# Patient Record
Sex: FEMALE | URBAN - METROPOLITAN AREA
[De-identification: names, ages, dates, MRNs, and addresses within clinical notes are randomized per-mention and may not be internally consistent; named-entity substitution may affect disease eponyms.]

---

## 2024-01-09 NOTE — PROGRESS NOTES
"  [facility]   [facility] [place]       Reason For Consult  \"Cirrhosis\" during fibroscan    SUBJECTIVE     History Of Present Illness  Jeni Vasquez is a 53 y.o. female with a past medical history of overweight (BMI 28), THONG with CPAP, asthma referred for liver evaluation.   Pt recently underwent colonoscopy. At the visit, pt had fibroscan that was consistent with cirrhosis (7/20/2023 Fibro Scan; , E 16.2 kPa).    Pt recalls to have fatty liver disease 15 years ago.   Endorses intermittent RUQ \"nagging\" pain. No nausea/vomiting, joint pain, melena/hematochezia.  Pt has asthma on daily inhalers. Not requiring systemic prednisone.    Used to drink 4~5 glasses of chantelle, vodka martini 5~7/week but cut down to 2~3 vodka martini 2/week after told to have \"cirrhosis\".   In terms of diet, pt is avoiding processed food, lots of fish, rice, pasta, vegetables/berries, ocassional chips.   For exercise, used to hike but limited due to asthma.   Weight fluctuates 10 lbs, but stable around 190 lbs for 27 years since she delivered her son.     PMHx:  Asthma on daily inhalers    Meds:  Takes multivitamine  Denies tylenol or NSAIDs.     Fhx:  Autoimmune issues in father? (Non-hodgikin mantle)  Father with colon cancer diagnosed  (late 40s)    Review of Systems  Constitutional: denies fever, chills, weight loss  HEENT: denies oral ulcers, dysphagia  Cardiovascular: denies chest pain  Respiratory: denies shortness of breath  GI: see HPI  Musculoskeletal: denies arthralgia, myalgia  Hem/Onc: denies easy bleeding or bruising  Dermatology: denies jaundice, rash  Psychology: denies depression    All ROS were negative unless otherwise stated above.         Past Medical History:    Past Medical History:   Diagnosis Date    Asthma     Cirrhosis (CMS/HCC)        Home Medications  (Not in a hospital admission)        Surgical History:    Past Surgical History:   Procedure Laterality Date    " "COLONOSCOPY         Allergies:    Allergies   Allergen Reactions    Acetaminophen GI Upset and Rash     Other reaction(s): GI Upset    Hydrocodone-Acetaminophen GI Upset and Unknown     Other reaction(s): GI Upset, Other (See Comments)       Social History:    Social History     Socioeconomic History    Marital status: Unknown     Spouse name: Not on file    Number of children: Not on file    Years of education: Not on file    Highest education level: Not on file   Occupational History    Not on file   Tobacco Use    Smoking status: Never     Passive exposure: Never    Smokeless tobacco: Never   Vaping Use    Vaping Use: Never used   Substance and Sexual Activity    Alcohol use: Yes    Drug use: Never    Sexual activity: Yes     Partners: Male   Other Topics Concern    Not on file   Social History Narrative    Not on file     Social Determinants of Health     Financial Resource Strain: Not on file   Food Insecurity: No Food Insecurity (1/10/2024)    Hunger Vital Sign     Worried About Running Out of Food in the Last Year: Never true     Ran Out of Food in the Last Year: Never true   Transportation Needs: Not on file   Physical Activity: Not on file   Stress: Not on file   Social Connections: Not on file   Intimate Partner Violence: Not on file   Housing Stability: Not on file       Family History:    Family History   Problem Relation Name Age of Onset    COPD Mother      Other (mantle cell lymphoma) Father         EXAM     Vitals:    Vitals:    01/10/24 0806   BP: (!) 154/104   Pulse: 84   Resp: 18   Temp: 36.4 °C (97.6 °F)   SpO2: 97%   Weight: 85.5 kg (188 lb 8 oz)   Height: 1.753 m (5' 9\")         Physical Exam  General: well-nourished, no acute distress  HEENT: EOMI. Moist mucosa  Respiratory: nonlabored breathing on room air  Cardiovascular: RRR, no murmurs/rubs/gallops  Abdomen: Soft, nontender, nondistended, bowel sounds present. No masses palpated  Extremities: no edema, no asterixis  Neuro: alert and " oriented, CNII-XII grossly intact, moves all 4 extremities with no focal deficits    OBJECTIVE                                                                              Medications       Current Outpatient Medications:     albuterol 2.5 mg /3 mL (0.083 %) nebulizer solution, 3 mL every 6 hours if needed., Disp: , Rfl:     albuterol 90 mcg/actuation inhaler, Inhale 2 puffs 4 times a day., Disp: , Rfl:     fluticasone (Flonase) 50 mcg/actuation nasal spray, Administer 2 sprays into affected nostril(s) once daily., Disp: , Rfl:     fluticasone propion-salmeteroL (Advair Diskus) 100-50 mcg/dose diskus inhaler, 1 puff 2 times a day., Disp: , Rfl:     ibuprofen 400 mg tablet, Take 1 tablet (400 mg) by mouth every 6 hours if needed., Disp: , Rfl:     loratadine (Claritin) 10 mg tablet, Take 1 tablet (10 mg) by mouth once daily., Disp: , Rfl:     traZODone (Desyrel) 100 mg tablet, Take 0.5 tablets (50 mg) by mouth as needed at bedtime., Disp: , Rfl:                                                                             Labs     3/2022   AST 45, ALT 42  INR 1.0   Alb4.3-->2.5   TP 8.1  ALP 82  T-rosmery 0.7                                                                              Imaging           3/23/2022  Liver U/S  Liver: Normal liver parenchyma is noted. No focal hepatic mass is seen.   The portal vein and hepatic veins are within normal limits.     4/18/2023  CTAP  Liver:       Echotexture:  Normal, homogeneous.        Echogenicity:  Increased        Surface contour:  Smooth        Lesions:  None.                                                                            GI Procedures     Scanned colonoscopy report         ASSESSMENT / PLAN                  ASSESSMENT/PLAN:    Jeni Vasquez is a 53 y.o. female with a past medical history of overweight (BMI 28), THONG with CPAP, asthma referred here after found to have F4 cirrhosis during fibroscan.     #THONG with CPAP  #Fatty liver   #Overweight (BMI  38)  #Thrombocytopenia  :: 3/2022 AST/ALT 45/42<2, INR 1.0 Alb 2.5,   :: 7/2023 NORM 1:80  :: Fibroscan 7/20/2023 Fibro Scan; , E 16.2 kPa  :: Liver U/S, CTAP from 2023 showed no signs of liver cirrhosis   -While fibroscan suggests f4 cirrhosis during fibroscan 7/2023, this may be falsely high in the setting of fatty liver especially CT or U/S from 2023 shows no changes suggestive of cirrhosis.   -NORM noted to be positive 1:80 which may suggest underlying autoimmune disease or other etiologies in addition to fatty liver.    Plan  -Obtain viral hepatitis, AMA/AMA, ceruloplasmin, AFP, A1AT, LFT, INR  -Transcutaneous liver biopsy to assess liver fibrosis  -We discussed importance of lifestyle modification (increase exercise, cut down intake of carbohydrates such as pasta, bread, juice, soda)   -RTC in 6 mos     Patient was seen and discussed with Dr. Cl Garcia MD  PGY4 Gastroenterology Fellow  [facility] [place]

## 2024-01-10 ENCOUNTER — OFFICE VISIT (OUTPATIENT)
Dept: GASTROENTEROLOGY | Facility: HOSPITAL | Age: 54
End: 2024-01-10
Payer: COMMERCIAL

## 2024-01-10 ENCOUNTER — LAB (OUTPATIENT)
Dept: LAB | Facility: LAB | Age: 54
End: 2024-01-10
Payer: COMMERCIAL

## 2024-01-10 VITALS
HEIGHT: 69 IN | WEIGHT: 188.5 LBS | OXYGEN SATURATION: 97 % | HEART RATE: 84 BPM | SYSTOLIC BLOOD PRESSURE: 154 MMHG | TEMPERATURE: 97.6 F | BODY MASS INDEX: 27.92 KG/M2 | RESPIRATION RATE: 18 BRPM | DIASTOLIC BLOOD PRESSURE: 104 MMHG

## 2024-01-10 DIAGNOSIS — K74.60 CIRRHOSIS OF LIVER WITHOUT ASCITES, UNSPECIFIED HEPATIC CIRRHOSIS TYPE (MULTI): ICD-10-CM

## 2024-01-10 DIAGNOSIS — K74.60 CIRRHOSIS OF LIVER WITHOUT ASCITES, UNSPECIFIED HEPATIC CIRRHOSIS TYPE (MULTI): Primary | ICD-10-CM

## 2024-01-10 LAB
A1AT SERPL NEPH-MCNC: 152 MG/DL (ref 84–218)
AFP SERPL-MCNC: 8 NG/ML (ref 0–9)
ALBUMIN SERPL BCP-MCNC: 4.3 G/DL (ref 3.4–5)
ALP SERPL-CCNC: 87 U/L (ref 33–110)
ALT SERPL W P-5'-P-CCNC: 45 U/L (ref 7–45)
ANION GAP SERPL CALC-SCNC: 14 MMOL/L (ref 10–20)
AST SERPL W P-5'-P-CCNC: 59 U/L (ref 9–39)
BASOPHILS # BLD AUTO: 0.09 X10*3/UL (ref 0–0.1)
BASOPHILS NFR BLD AUTO: 1.3 %
BILIRUB DIRECT SERPL-MCNC: 0.3 MG/DL (ref 0–0.3)
BILIRUB SERPL-MCNC: 1.5 MG/DL (ref 0–1.2)
BUN SERPL-MCNC: 8 MG/DL (ref 6–23)
CALCIUM SERPL-MCNC: 10.1 MG/DL (ref 8.6–10.6)
CERULOPLASMIN SERPL-MCNC: 29.2 MG/DL (ref 20–60)
CHLORIDE SERPL-SCNC: 104 MMOL/L (ref 98–107)
CO2 SERPL-SCNC: 27 MMOL/L (ref 21–32)
CREAT SERPL-MCNC: 0.75 MG/DL (ref 0.5–1.05)
EGFRCR SERPLBLD CKD-EPI 2021: >90 ML/MIN/1.73M*2
EOSINOPHIL # BLD AUTO: 0.14 X10*3/UL (ref 0–0.7)
EOSINOPHIL NFR BLD AUTO: 2.1 %
ERYTHROCYTE [DISTWIDTH] IN BLOOD BY AUTOMATED COUNT: 12.9 % (ref 11.5–14.5)
FERRITIN SERPL-MCNC: 335 NG/ML (ref 8–150)
GLUCOSE SERPL-MCNC: 90 MG/DL (ref 74–99)
HAV AB SER QL IA: REACTIVE
HBV CORE AB SER QL: REACTIVE
HBV SURFACE AB SER-ACNC: <3.1 MIU/ML
HBV SURFACE AG SERPL QL IA: NONREACTIVE
HCT VFR BLD AUTO: 49.9 % (ref 36–46)
HCV AB SER QL: NONREACTIVE
HGB BLD-MCNC: 16.6 G/DL (ref 12–16)
IMM GRANULOCYTES # BLD AUTO: 0.03 X10*3/UL (ref 0–0.7)
IMM GRANULOCYTES NFR BLD AUTO: 0.4 % (ref 0–0.9)
INR PPP: 1 (ref 0.9–1.1)
IRON SATN MFR SERPL: 50 % (ref 25–45)
IRON SERPL-MCNC: 185 UG/DL (ref 35–150)
LYMPHOCYTES # BLD AUTO: 1.84 X10*3/UL (ref 1.2–4.8)
LYMPHOCYTES NFR BLD AUTO: 27.6 %
MCH RBC QN AUTO: 34.6 PG (ref 26–34)
MCHC RBC AUTO-ENTMCNC: 33.3 G/DL (ref 32–36)
MCV RBC AUTO: 104 FL (ref 80–100)
MONOCYTES # BLD AUTO: 0.45 X10*3/UL (ref 0.1–1)
MONOCYTES NFR BLD AUTO: 6.7 %
NEUTROPHILS # BLD AUTO: 4.12 X10*3/UL (ref 1.2–7.7)
NEUTROPHILS NFR BLD AUTO: 61.9 %
NRBC BLD-RTO: 0 /100 WBCS (ref 0–0)
PLATELET # BLD AUTO: 166 X10*3/UL (ref 150–450)
POTASSIUM SERPL-SCNC: 4 MMOL/L (ref 3.5–5.3)
PROT SERPL-MCNC: 7.5 G/DL (ref 6.4–8.2)
PROTHROMBIN TIME: 11 SECONDS (ref 9.8–12.8)
RBC # BLD AUTO: 4.8 X10*6/UL (ref 4–5.2)
SODIUM SERPL-SCNC: 141 MMOL/L (ref 136–145)
TIBC SERPL-MCNC: 370 UG/DL (ref 240–445)
UIBC SERPL-MCNC: 185 UG/DL (ref 110–370)
WBC # BLD AUTO: 6.7 X10*3/UL (ref 4.4–11.3)

## 2024-01-10 PROCEDURE — 82390 ASSAY OF CERULOPLASMIN: CPT

## 2024-01-10 PROCEDURE — 85610 PROTHROMBIN TIME: CPT

## 2024-01-10 PROCEDURE — 80053 COMPREHEN METABOLIC PANEL: CPT

## 2024-01-10 PROCEDURE — 83550 IRON BINDING TEST: CPT

## 2024-01-10 PROCEDURE — 86381 MITOCHONDRIAL ANTIBODY EACH: CPT

## 2024-01-10 PROCEDURE — 82103 ALPHA-1-ANTITRYPSIN TOTAL: CPT

## 2024-01-10 PROCEDURE — 86803 HEPATITIS C AB TEST: CPT

## 2024-01-10 PROCEDURE — 36415 COLL VENOUS BLD VENIPUNCTURE: CPT

## 2024-01-10 PROCEDURE — 86706 HEP B SURFACE ANTIBODY: CPT

## 2024-01-10 PROCEDURE — 86256 FLUORESCENT ANTIBODY TITER: CPT

## 2024-01-10 PROCEDURE — 99214 OFFICE O/P EST MOD 30 MIN: CPT | Performed by: INTERNAL MEDICINE

## 2024-01-10 PROCEDURE — 82248 BILIRUBIN DIRECT: CPT

## 2024-01-10 PROCEDURE — 86225 DNA ANTIBODY NATIVE: CPT

## 2024-01-10 PROCEDURE — 1036F TOBACCO NON-USER: CPT | Performed by: INTERNAL MEDICINE

## 2024-01-10 PROCEDURE — 86708 HEPATITIS A ANTIBODY: CPT

## 2024-01-10 PROCEDURE — 86704 HEP B CORE ANTIBODY TOTAL: CPT

## 2024-01-10 PROCEDURE — 99204 OFFICE O/P NEW MOD 45 MIN: CPT | Performed by: INTERNAL MEDICINE

## 2024-01-10 PROCEDURE — 85025 COMPLETE CBC W/AUTO DIFF WBC: CPT

## 2024-01-10 PROCEDURE — 83540 ASSAY OF IRON: CPT

## 2024-01-10 PROCEDURE — 87340 HEPATITIS B SURFACE AG IA: CPT

## 2024-01-10 PROCEDURE — 82728 ASSAY OF FERRITIN: CPT

## 2024-01-10 PROCEDURE — 86038 ANTINUCLEAR ANTIBODIES: CPT

## 2024-01-10 PROCEDURE — 86235 NUCLEAR ANTIGEN ANTIBODY: CPT

## 2024-01-10 PROCEDURE — 86015 ACTIN ANTIBODY EACH: CPT

## 2024-01-10 PROCEDURE — 82105 ALPHA-FETOPROTEIN SERUM: CPT

## 2024-01-10 RX ORDER — IBUPROFEN 400 MG/1
400 TABLET ORAL EVERY 6 HOURS PRN
COMMUNITY

## 2024-01-10 RX ORDER — LORATADINE 10 MG/1
10 TABLET ORAL
COMMUNITY
Start: 2018-10-12

## 2024-01-10 RX ORDER — ALBUTEROL SULFATE 0.83 MG/ML
3 SOLUTION RESPIRATORY (INHALATION) EVERY 6 HOURS PRN
COMMUNITY
Start: 2022-04-08

## 2024-01-10 RX ORDER — FLUTICASONE PROPIONATE AND SALMETEROL 100; 50 UG/1; UG/1
1 POWDER RESPIRATORY (INHALATION) 2 TIMES DAILY
COMMUNITY

## 2024-01-10 RX ORDER — FLUTICASONE PROPIONATE 50 MCG
2 SPRAY, SUSPENSION (ML) NASAL
COMMUNITY
Start: 2018-10-12

## 2024-01-10 RX ORDER — ALBUTEROL SULFATE 90 UG/1
2 AEROSOL, METERED RESPIRATORY (INHALATION) 4 TIMES DAILY
COMMUNITY
Start: 2020-08-17

## 2024-01-10 RX ORDER — TRAZODONE HYDROCHLORIDE 100 MG/1
50 TABLET ORAL NIGHTLY PRN
COMMUNITY

## 2024-01-10 SDOH — ECONOMIC STABILITY: FOOD INSECURITY: WITHIN THE PAST 12 MONTHS, YOU WORRIED THAT YOUR FOOD WOULD RUN OUT BEFORE YOU GOT MONEY TO BUY MORE.: NEVER TRUE

## 2024-01-10 SDOH — ECONOMIC STABILITY: FOOD INSECURITY: WITHIN THE PAST 12 MONTHS, THE FOOD YOU BOUGHT JUST DIDN'T LAST AND YOU DIDN'T HAVE MONEY TO GET MORE.: NEVER TRUE

## 2024-01-10 ASSESSMENT — LIFESTYLE VARIABLES
SKIP TO QUESTIONS 9-10: 0
HOW OFTEN DO YOU HAVE SIX OR MORE DRINKS ON ONE OCCASION: NEVER
HOW MANY STANDARD DRINKS CONTAINING ALCOHOL DO YOU HAVE ON A TYPICAL DAY: 3 OR 4
AUDIT-C TOTAL SCORE: 4
HOW OFTEN DO YOU HAVE A DRINK CONTAINING ALCOHOL: 2-3 TIMES A WEEK

## 2024-01-10 ASSESSMENT — ENCOUNTER SYMPTOMS
DEPRESSION: 0
LOSS OF SENSATION IN FEET: 0
OCCASIONAL FEELINGS OF UNSTEADINESS: 0

## 2024-01-10 ASSESSMENT — PAIN SCALES - GENERAL: PAINLEVEL: 0-NO PAIN

## 2024-01-10 ASSESSMENT — PATIENT HEALTH QUESTIONNAIRE - PHQ9
2. FEELING DOWN, DEPRESSED OR HOPELESS: NOT AT ALL
1. LITTLE INTEREST OR PLEASURE IN DOING THINGS: NOT AT ALL
SUM OF ALL RESPONSES TO PHQ9 QUESTIONS 1 AND 2: 0

## 2024-01-10 NOTE — PATIENT INSTRUCTIONS
[phone] (See below for department name when scheduling)    [x]  LABS:due date : Now (Labs can be done at any  location)   A workup for underlying causes of liver disease has been ordered.  This includes checking to see if you are protected against Hepatitis A & B. If you are not, it is recommended that anyone with chronic liver disease be vaccinated.                                    [x]  FOLLOW UP (Department Gastro): due date : 6 month       [x]   LIVER BIOPSY    If you have any questions or need assistance, please don't hesitate to contact us.    Post: Office [phone] Fax: [phone]  Hepatology Nurse Coordinator: Rubina RODRIGUEZ [phone]

## 2024-01-11 LAB
ANA PATTERN: ABNORMAL
ANA SER QL HEP2 SUBST: POSITIVE
ANA TITR SER IF: ABNORMAL {TITER}
CENTROMERE B AB SER-ACNC: <0.2 AI
CHROMATIN AB SERPL-ACNC: <0.2 AI
DSDNA AB SER-ACNC: 1 IU/ML
ENA JO1 AB SER QL IA: <0.2 AI
ENA RNP AB SER IA-ACNC: 0.2 AI
ENA SCL70 AB SER QL IA: <0.2 AI
ENA SM AB SER IA-ACNC: <0.2 AI
ENA SM+RNP AB SER QL IA: 0.2 AI
ENA SS-A AB SER IA-ACNC: <0.2 AI
ENA SS-B AB SER IA-ACNC: <0.2 AI
MITOCHONDRIA AB SER QL IF: NEGATIVE
RIBOSOMAL P AB SER-ACNC: <0.2 AI

## 2024-01-12 LAB — SMOOTH MUSCLE AB SER QL IF: ABNORMAL

## 2024-09-17 ENCOUNTER — TELEPHONE (OUTPATIENT)
Dept: GASTROENTEROLOGY | Facility: HOSPITAL | Age: 54
End: 2024-09-17
Payer: COMMERCIAL

## 2024-09-17 DIAGNOSIS — K74.60 CIRRHOSIS OF LIVER WITHOUT ASCITES, UNSPECIFIED HEPATIC CIRRHOSIS TYPE (MULTI): ICD-10-CM

## 2024-09-17 DIAGNOSIS — Z01.818 PREOP TESTING: ICD-10-CM

## 2024-10-08 ENCOUNTER — LAB (OUTPATIENT)
Dept: LAB | Facility: LAB | Age: 54
End: 2024-10-08
Payer: COMMERCIAL

## 2024-10-08 DIAGNOSIS — Z01.818 PREOP TESTING: ICD-10-CM

## 2024-10-08 DIAGNOSIS — K74.60 CIRRHOSIS OF LIVER WITHOUT ASCITES, UNSPECIFIED HEPATIC CIRRHOSIS TYPE (MULTI): ICD-10-CM

## 2024-10-08 PROCEDURE — 85610 PROTHROMBIN TIME: CPT

## 2024-10-08 PROCEDURE — 85025 COMPLETE CBC W/AUTO DIFF WBC: CPT

## 2024-10-08 PROCEDURE — 36415 COLL VENOUS BLD VENIPUNCTURE: CPT

## 2024-10-09 LAB
BASOPHILS # BLD AUTO: 0.11 X10*3/UL (ref 0–0.1)
BASOPHILS NFR BLD AUTO: 2 %
EOSINOPHIL # BLD AUTO: 0.11 X10*3/UL (ref 0–0.7)
EOSINOPHIL NFR BLD AUTO: 2 %
ERYTHROCYTE [DISTWIDTH] IN BLOOD BY AUTOMATED COUNT: 12.7 % (ref 11.5–14.5)
HCT VFR BLD AUTO: 45.7 % (ref 36–46)
HGB BLD-MCNC: 14.9 G/DL (ref 12–16)
IMM GRANULOCYTES # BLD AUTO: 0.01 X10*3/UL (ref 0–0.7)
IMM GRANULOCYTES NFR BLD AUTO: 0.2 % (ref 0–0.9)
INR PPP: 1.1 (ref 0.9–1.1)
LYMPHOCYTES # BLD AUTO: 1.34 X10*3/UL (ref 1.2–4.8)
LYMPHOCYTES NFR BLD AUTO: 23.8 %
MCH RBC QN AUTO: 33.6 PG (ref 26–34)
MCHC RBC AUTO-ENTMCNC: 32.6 G/DL (ref 32–36)
MCV RBC AUTO: 103 FL (ref 80–100)
MONOCYTES # BLD AUTO: 0.42 X10*3/UL (ref 0.1–1)
MONOCYTES NFR BLD AUTO: 7.5 %
NEUTROPHILS # BLD AUTO: 3.64 X10*3/UL (ref 1.2–7.7)
NEUTROPHILS NFR BLD AUTO: 64.5 %
NRBC BLD-RTO: 0 /100 WBCS (ref 0–0)
PLATELET # BLD AUTO: 177 X10*3/UL (ref 150–450)
PROTHROMBIN TIME: 12.9 SECONDS (ref 9.8–12.8)
RBC # BLD AUTO: 4.44 X10*6/UL (ref 4–5.2)
WBC # BLD AUTO: 5.6 X10*3/UL (ref 4.4–11.3)

## 2024-10-11 ENCOUNTER — HOSPITAL ENCOUNTER (OUTPATIENT)
Dept: RADIOLOGY | Facility: HOSPITAL | Age: 54
Discharge: HOME | End: 2024-10-11
Payer: COMMERCIAL

## 2024-10-11 VITALS
WEIGHT: 181 LBS | BODY MASS INDEX: 26.81 KG/M2 | HEIGHT: 69 IN | HEART RATE: 76 BPM | DIASTOLIC BLOOD PRESSURE: 75 MMHG | RESPIRATION RATE: 18 BRPM | SYSTOLIC BLOOD PRESSURE: 126 MMHG | OXYGEN SATURATION: 94 % | TEMPERATURE: 97 F

## 2024-10-11 DIAGNOSIS — K74.60 CIRRHOSIS OF LIVER WITHOUT ASCITES, UNSPECIFIED HEPATIC CIRRHOSIS TYPE (MULTI): ICD-10-CM

## 2024-10-11 PROCEDURE — 99152 MOD SED SAME PHYS/QHP 5/>YRS: CPT

## 2024-10-11 PROCEDURE — 2500000004 HC RX 250 GENERAL PHARMACY W/ HCPCS (ALT 636 FOR OP/ED): Performed by: RADIOLOGY

## 2024-10-11 PROCEDURE — 7100000009 HC PHASE TWO TIME - INITIAL BASE CHARGE

## 2024-10-11 PROCEDURE — 2720000007 HC OR 272 NO HCPCS

## 2024-10-11 PROCEDURE — 7100000010 HC PHASE TWO TIME - EACH INCREMENTAL 1 MINUTE

## 2024-10-11 PROCEDURE — 47000 NEEDLE BIOPSY OF LIVER PERQ: CPT

## 2024-10-11 RX ORDER — MIDAZOLAM HYDROCHLORIDE 1 MG/ML
INJECTION INTRAMUSCULAR; INTRAVENOUS
Status: COMPLETED | OUTPATIENT
Start: 2024-10-11 | End: 2024-10-11

## 2024-10-11 RX ORDER — FENTANYL CITRATE 50 UG/ML
INJECTION, SOLUTION INTRAMUSCULAR; INTRAVENOUS
Status: COMPLETED | OUTPATIENT
Start: 2024-10-11 | End: 2024-10-11

## 2024-10-11 ASSESSMENT — PAIN SCALES - GENERAL
PAINLEVEL_OUTOF10: 0 - NO PAIN
PAINLEVEL_OUTOF10: 8

## 2024-10-11 ASSESSMENT — PAIN - FUNCTIONAL ASSESSMENT: PAIN_FUNCTIONAL_ASSESSMENT: 0-10

## 2024-10-11 NOTE — PRE-PROCEDURE NOTE
Interventional Radiology Preprocedure Note    Indication for procedure: The encounter diagnosis was Cirrhosis of liver without ascites, unspecified hepatic cirrhosis type (Multi).    Relevant review of systems: NA    Relevant Labs:   Lab Results   Component Value Date    CREATININE 0.75 01/10/2024    EGFR >90 01/10/2024    INR 1.1 10/08/2024    PROTIME 12.9 (H) 10/08/2024       Planned Sedation/Anesthesia: Moderate    Airway assessment: normal    Directed physical examination:    No focal findings    Mallampati: III (soft and hard palate and base of uvula visible)    ASA Score: ASA 2 - Patient with mild systemic disease with no functional limitations    Benefits, risks and alternatives of procedure and planned sedation have been discussed with the patient and/or their representative. All questions answered and they agree to proceed.

## 2024-10-11 NOTE — POST-PROCEDURE NOTE
Interventional Radiology Brief Postprocedure Note    Attending: Anupama Bruner MD    Assistant: Juan Castillo MD    Diagnosis: Liver cirrhosis    Description of procedure: Successful biopsy of the liver.  Two core biopsy(ies) obtained and sent to pathology.  Please see radiology report for full details.        Anesthesia:  MAC    Complications: None    Estimated Blood Loss: minimal    Medications (Filter: Administrations occurring from 0955 to 1014 on 10/11/24) As of 10/11/24 1014      fentaNYL PF (Sublimaze) injection (mcg) Total dose:  100 mcg      Date/Time Rate/Dose/Volume Action       10/11/24  1000 50 mcg Given      1006 50 mcg Given               midazolam (Versed) injection (mg) Total dose:  2 mg      Date/Time Rate/Dose/Volume Action       10/11/24  1000 1 mg Given      1006 1 mg Given                   No specimens collected      See detailed result report with images in PACS.    The patient tolerated the procedure well without incident or complication and is in stable condition.

## 2024-10-17 LAB
LABORATORY COMMENT REPORT: NORMAL
PATH REPORT.FINAL DX SPEC: NORMAL
PATH REPORT.GROSS SPEC: NORMAL
PATH REPORT.RELEVANT HX SPEC: NORMAL
PATH REPORT.TOTAL CANCER: NORMAL

## 2024-10-24 DIAGNOSIS — K74.02 HEPATIC FIBROSIS, ADVANCED FIBROSIS: ICD-10-CM

## 2024-11-06 ENCOUNTER — APPOINTMENT (OUTPATIENT)
Dept: RADIOLOGY | Facility: CLINIC | Age: 54
End: 2024-11-06
Payer: COMMERCIAL

## 2025-01-22 ENCOUNTER — OFFICE VISIT (OUTPATIENT)
Dept: GASTROENTEROLOGY | Facility: HOSPITAL | Age: 55
End: 2025-01-22
Payer: COMMERCIAL

## 2025-01-22 VITALS
BODY MASS INDEX: 26.23 KG/M2 | OXYGEN SATURATION: 96 % | TEMPERATURE: 98.3 F | SYSTOLIC BLOOD PRESSURE: 132 MMHG | HEART RATE: 90 BPM | DIASTOLIC BLOOD PRESSURE: 80 MMHG | WEIGHT: 177.6 LBS

## 2025-01-22 DIAGNOSIS — K74.60 CIRRHOSIS OF LIVER WITHOUT ASCITES, UNSPECIFIED HEPATIC CIRRHOSIS TYPE (MULTI): ICD-10-CM

## 2025-01-22 DIAGNOSIS — K76.0 METABOLIC DYSFUNCTION-ASSOCIATED STEATOTIC LIVER DISEASE (MASLD): ICD-10-CM

## 2025-01-22 PROBLEM — K76.9 LIVER DISEASE: Status: ACTIVE | Noted: 2024-10-08

## 2025-01-22 PROCEDURE — 1036F TOBACCO NON-USER: CPT | Performed by: INTERNAL MEDICINE

## 2025-01-22 PROCEDURE — 99214 OFFICE O/P EST MOD 30 MIN: CPT | Performed by: INTERNAL MEDICINE

## 2025-01-22 RX ORDER — RESMETIROM 100 MG/1
100 TABLET, COATED ORAL DAILY
Qty: 30 TABLET | Refills: 11 | Status: CANCELLED | OUTPATIENT
Start: 2025-01-22 | End: 2026-01-22

## 2025-01-22 SDOH — ECONOMIC STABILITY: FOOD INSECURITY: WITHIN THE PAST 12 MONTHS, THE FOOD YOU BOUGHT JUST DIDN'T LAST AND YOU DIDN'T HAVE MONEY TO GET MORE.: NEVER TRUE

## 2025-01-22 SDOH — ECONOMIC STABILITY: FOOD INSECURITY: WITHIN THE PAST 12 MONTHS, YOU WORRIED THAT YOUR FOOD WOULD RUN OUT BEFORE YOU GOT MONEY TO BUY MORE.: NEVER TRUE

## 2025-01-22 ASSESSMENT — LIFESTYLE VARIABLES
HOW OFTEN DO YOU HAVE SIX OR MORE DRINKS ON ONE OCCASION: NEVER
HOW MANY STANDARD DRINKS CONTAINING ALCOHOL DO YOU HAVE ON A TYPICAL DAY: 1 OR 2
AUDIT-C TOTAL SCORE: 1
SKIP TO QUESTIONS 9-10: 1
HOW OFTEN DO YOU HAVE A DRINK CONTAINING ALCOHOL: MONTHLY OR LESS

## 2025-01-22 ASSESSMENT — PATIENT HEALTH QUESTIONNAIRE - PHQ9
1. LITTLE INTEREST OR PLEASURE IN DOING THINGS: NOT AT ALL
SUM OF ALL RESPONSES TO PHQ9 QUESTIONS 1 & 2: 0
2. FEELING DOWN, DEPRESSED OR HOPELESS: NOT AT ALL

## 2025-01-22 ASSESSMENT — PAIN SCALES - GENERAL: PAINLEVEL_OUTOF10: 0-NO PAIN

## 2025-01-22 NOTE — PATIENT INSTRUCTIONS
1) Return to the clinic in 3 months   2) Attempt complete alcohol abstinence     If you have any questions or need assistance, please don't hesitate to contact us.     Our offices are located at [facility].  Please use the numbers below when calling.    Post:         Office [phone] Fax: [phone]  Hepatology Nurse Coordinator:         Rubina RODRIGUEZ [phone]     SCHEDULING  You will get a notification through [employer] or a phone call to help you schedule all your tests. If you prefer, feel free to call the main scheduling number to set up any tests you need.    Main Scheduling Number: [phone]  (See below for department name when scheduling)    Here is a summary of the tests we have ordered and when they are due:   [x] LABS (Can be done at any  Location)  Due : Today and July  Type : LIVER CANCER SCREENING: Patients with advanced fibrosis or cirrhosis are at increased risk for liver cancer.  Successful treatment of liver cancer depends on early detection.  You should have regular interval screening for liver cancer every 6 months.  A blood test called Alpha-fetoprotein (AFP) and ultrasound of the liver is an important part of liver cancer screening to detect tumors.  You should also expect to have a follow up appointment with your liver doctor every 6 months    [x] IMAGING (Department Radiology) Due : First available and July  Type : ULTRASOUND    [x] FOLLOW UP  (Department Gastro) Due : 3 Month(s)

## 2025-01-22 NOTE — PROGRESS NOTES
Outpatient Hepatology Progress Note     Name: Jeni Vasquez  : 1970  MRN: [id number]  Date: 2025     History of Present Illness:  Patient is a 54 y.o. female with a PMH significant for overweight (BMI 28), THONG with CPAP, asthma referred for liver evaluation. She reports ongoing intermittent fevers since her trip to [facility] in 2024. She has undergone a workup for these fevers with hematology and rheumatology which revealed elevated autoimmune markers, including NORM. She is scheduled for infectious disease evaluation.  Regarding her liver disease, she admits to continued alcohol consumption, averaging 2-4 alcoholic drinks per week, typically wine and vodka martinis. She is making efforts to maintain a high-protein diet, supplementing with Boost shakes when her appetite is reduced due to the fevers. She avoids fast foods and has no other dietary concerns to report.    Review of Systems:  As per HPI.     Past Medical History:  Past Medical History:   Diagnosis Date    Asthma     Cirrhosis (Multi)        Past Surgical History:  Past Surgical History:   Procedure Laterality Date    COLONOSCOPY         Family History:  Family History   Problem Relation Name Age of Onset    COPD Mother      Other (mantle cell lymphoma) Father          Medications:  Current Outpatient Medications   Medication Instructions    albuterol 2.5 mg /3 mL (0.083 %) nebulizer solution 3 mL, Every 6 hours PRN    albuterol 90 mcg/actuation inhaler 2 puffs, 4 times daily    fluticasone (Flonase) 50 mcg/actuation nasal spray 2 sprays, Daily RT    fluticasone propion-salmeteroL (Advair Diskus) 100-50 mcg/dose diskus inhaler 1 puff, 2 times daily    ibuprofen 400 mg, Every 6 hours PRN    loratadine (CLARITIN) 10 mg, Daily RT    traZODone (DESYREL) 50 mg, oral, Nightly PRN        Allergies:  Allergies   Allergen Reactions    Acetaminophen GI Upset and Rash     Other reaction(s): GI Upset    Hydrocodone-Acetaminophen GI Upset and  Unknown     Other reaction(s): GI Upset, Other (See Comments)         Physical Exam:  General: well-nourished, no acute distress  HEENT: EOMI. Moist mucosa  Respiratory: nonlabored breathing on room air  Cardiovascular: RRR, no murmurs/rubs/gallops  Abdomen: Soft, nontender, nondistended, bowel sounds present. No masses palpated  Extremities: no edema, no asterixis  Neuro: alert and oriented, CNII-XII grossly intact, moves all 4 extremities with no focal deficits    Vitals:  Vitals:    01/22/25 0925   BP: 132/80   Pulse: 90   Temp: 36.8 °C (98.3 °F)   SpO2: 96%        Labs:  Lab Results   Component Value Date    WBC 5.6 10/08/2024    HGB 14.9 10/08/2024    HCT 45.7 10/08/2024     (H) 10/08/2024     10/08/2024     Lab Results   Component Value Date    GLUCOSE 90 01/10/2024    CALCIUM 10.1 01/10/2024     01/10/2024    K 4.0 01/10/2024    CO2 27 01/10/2024     01/10/2024    BUN 8 01/10/2024    CREATININE 0.75 01/10/2024     Lab Results   Component Value Date    ALT 45 01/10/2024    AST 59 (H) 01/10/2024    ALKPHOS 87 01/10/2024    BILITOT 1.5 (H) 01/10/2024       Assessment and Plan:  This is a patient with a history of fibrosis and cirrhosis who continues to consume alcohol, averaging 2-4 drinks per week, despite counseling on the risks of alcohol use in the context of her liver disease. She reports ongoing intermittent fevers since a trip to [facility] in September 2024, with a workup revealing elevated autoimmune markers, including NORM. These fevers have contributed to intermittent appetite loss, which she mitigates with Boost shakes and efforts to maintain a high-protein diet. She avoids fast food and otherwise reports no additional dietary concerns. Her liver disease remains stable at this time, but the ongoing alcohol use poses a significant risk for further progression and complications. Workup for the intermittent fevers and elevated autoimmune markers is ongoing, with infectious  disease follow-up planned. The patient is scheduled for reassessment in 3 months to evaluate her condition and determine readiness for initiation of Rezdiffra therapy.    Plan:  - discussed and encouraged alcohol cessation  - reinforced the importance of a high-protein diet   - return to the clinic in 3 months for reassessment of her overall condition, including the status of intermittent fevers and readiness for initiation of Rezdiffra.  - repeat labs and liver ultrasound every 6 months     Case reviewed and discussed with attending Hepatologist Mladonado Raymundo MD.    Apolinar Devi MD, PhD  Gastroenterology Fellow, PGY-6  [facility]   Division of Gastroenterology and Liver Disease

## 2025-01-23 NOTE — PROGRESS NOTES
I saw and evaluated the patient. I personally obtained the key and critical portions of the history and physical exam or was physically present for key and critical portions performed by the resident/fellow. I reviewed the resident/fellow's documentation and discussed the patient with the resident/fellow. I agree with the resident/fellow's medical decision making as documented in the note with the exception/addition of the following:  Needs to complete evaluation of fevers prior to starting new medication for MAFLD.   Continue to limit alcohol and carbohydrates in diet.   Maldonado Raymundo MD

## 2025-02-12 ENCOUNTER — HOSPITAL ENCOUNTER (OUTPATIENT)
Dept: RADIOLOGY | Facility: CLINIC | Age: 55
Discharge: HOME | End: 2025-02-12
Payer: COMMERCIAL

## 2025-02-12 DIAGNOSIS — K74.60 CIRRHOSIS OF LIVER WITHOUT ASCITES, UNSPECIFIED HEPATIC CIRRHOSIS TYPE (MULTI): ICD-10-CM

## 2025-02-12 PROCEDURE — 76705 ECHO EXAM OF ABDOMEN: CPT
